# Patient Record
Sex: MALE | Race: WHITE
[De-identification: names, ages, dates, MRNs, and addresses within clinical notes are randomized per-mention and may not be internally consistent; named-entity substitution may affect disease eponyms.]

---

## 2017-02-22 ENCOUNTER — HOSPITAL ENCOUNTER (EMERGENCY)
Dept: HOSPITAL 45 - C.EDB | Age: 52
Discharge: HOME | End: 2017-02-22
Payer: COMMERCIAL

## 2017-02-22 VITALS
BODY MASS INDEX: 30.96 KG/M2 | WEIGHT: 209 LBS | HEIGHT: 69.02 IN | WEIGHT: 209 LBS | BODY MASS INDEX: 30.96 KG/M2 | HEIGHT: 69.02 IN

## 2017-02-22 VITALS — DIASTOLIC BLOOD PRESSURE: 49 MMHG | HEART RATE: 69 BPM | SYSTOLIC BLOOD PRESSURE: 94 MMHG | OXYGEN SATURATION: 95 %

## 2017-02-22 VITALS — TEMPERATURE: 98.96 F

## 2017-02-22 DIAGNOSIS — Z80.9: ICD-10-CM

## 2017-02-22 DIAGNOSIS — Z83.3: ICD-10-CM

## 2017-02-22 DIAGNOSIS — R50.9: Primary | ICD-10-CM

## 2017-02-22 DIAGNOSIS — Z90.49: ICD-10-CM

## 2017-02-22 DIAGNOSIS — Z90.89: ICD-10-CM

## 2017-02-22 DIAGNOSIS — Z84.1: ICD-10-CM

## 2017-02-22 DIAGNOSIS — M54.5: ICD-10-CM

## 2017-02-22 DIAGNOSIS — Z82.49: ICD-10-CM

## 2017-02-22 DIAGNOSIS — B34.9: ICD-10-CM

## 2017-02-22 DIAGNOSIS — D69.6: ICD-10-CM

## 2017-02-22 LAB
ALP SERPL-CCNC: 61 U/L (ref 45–117)
ALT SERPL-CCNC: 55 U/L (ref 12–78)
ANION GAP SERPL CALC-SCNC: 7 MMOL/L (ref 3–11)
APPEARANCE UR: CLEAR
AST SERPL-CCNC: 39 U/L (ref 15–37)
BASOPHILS # BLD: 0 K/UL (ref 0–0.2)
BASOPHILS NFR BLD: 0 %
BILIRUB UR-MCNC: (no result) MG/DL
BUN SERPL-MCNC: 20 MG/DL (ref 7–18)
BUN/CREAT SERPL: 14.6 (ref 10–20)
CALCIUM SERPL-MCNC: 8.3 MG/DL (ref 8.5–10.1)
CHLORIDE SERPL-SCNC: 106 MMOL/L (ref 98–107)
CO2 SERPL-SCNC: 27 MMOL/L (ref 21–32)
COLOR UR: (no result)
COMPLETE: YES
CREAT CL PREDICTED SERPL C-G-VRATE: 72.5 ML/MIN
CREAT SERPL-MCNC: 1.4 MG/DL (ref 0.6–1.4)
EOSINOPHIL NFR BLD AUTO: 127 K/UL (ref 130–400)
GLUCOSE SERPL-MCNC: 87 MG/DL (ref 70–99)
HCT VFR BLD CALC: 43.9 % (ref 42–52)
IG%: 0 %
IMM GRANULOCYTES NFR BLD AUTO: 26.9 %
LYMPHOCYTES # BLD: 0.76 K/UL (ref 1.2–3.4)
MANUAL MICROSCOPIC REQUIRED?: NO
MCH RBC QN AUTO: 32.1 PG (ref 25–34)
MCHC RBC AUTO-ENTMCNC: 36.2 G/DL (ref 32–36)
MCV RBC AUTO: 88.5 FL (ref 80–100)
MONOCYTES NFR BLD: 12.7 %
NEUTROPHILS # BLD AUTO: 0 %
NEUTROPHILS NFR BLD AUTO: 60.4 %
NITRITE UR QL STRIP: (no result)
PH UR STRIP: 5.5 [PH] (ref 4.5–7.5)
PMV BLD AUTO: 9.8 FL (ref 7.4–10.4)
POTASSIUM SERPL-SCNC: 3.8 MMOL/L (ref 3.5–5.1)
RBC # BLD AUTO: 4.96 M/UL (ref 4.7–6.1)
REVIEW REQ?: NO
SODIUM SERPL-SCNC: 140 MMOL/L (ref 136–145)
SP GR UR STRIP: > 1.045 (ref 1–1.03)
URINE BILL WITH OR WITHOUT MIC: (no result)
UROBILINOGEN UR-MCNC: (no result) MG/DL
WBC # BLD AUTO: 2.83 K/UL (ref 4.8–10.8)

## 2017-02-22 NOTE — DIAGNOSTIC IMAGING REPORT
ABDOMEN AND PELVIS CT WITHOUT CONTRAST



CT DOSE: 492.19 mGy.cm



HISTORY: Right abdominal pain  right flank pain



TECHNIQUE: Multiaxial CT images of the abdomen and pelvis were performed without

contrast.



COMPARISON STUDY: 10/17/2014



FINDINGS: Lung bases are clear. Liver spleen and pancreas are unremarkable.

Potential gastric wall thickening with a trace amount of pericolonic

infiltrative change. This is best seen transaxial image 33



Small nonobstructing lower pole left renal calcification. No evidence for an

obstructing urinary tract calculus.



Bowel pattern is remarkable for scattered diverticuli. There has been a mid

sigmoid anastomosis. Bladder is midline but relatively collapsed. There is no

free fluid within the pelvic cul-de-sac. I history the appendix has been

removed.



IMPRESSION: 



1. Gastric wall thickening raising the possibility of gastritis.

2. Endoscopic evaluation of the stomach is suggested.





3. Small nonobstructing lower pole left renal calcification.

4. No evidence for an obstructing urinary tract calculus. 







Electronically signed by:  Ike Stuart M.D.

2/22/2017 9:48 AM



Dictated Date/Time:  2/22/2017 9:41 AM

## 2017-02-22 NOTE — EMERGENCY ROOM VISIT NOTE
History


Report prepared by Char:  Ramón Bledsoe


Under the Supervision of:  NICOLE CarcamoO.


First contact with patient:  08:53


Chief Complaint:  ILLNESS


Stated Complaint:  FEVER, PAIN IN LOWER BACK/GROIN AREA X 4 DAYS





History of Present Illness


The patient is a 51 year old male who presents to the Emergency Room with 

complaints of flank pain. The patient states that 3-4 days ago he started 

noticing subjective fever. He has been taking Motrin and Tylenol with good 

relief of the symptoms but he started noticing lower back pain earlier today. 

He states the pain is mostly in the lower back but goes around to his right 

groin. He denies having any rash. He states his feels similar to when he is had 

kidney stones in the past. He states he's had some urinary symptoms including 

frequency as well as dysuria. He denies having any vomiting but did notice 

nausea. He denies having any chest pain but did experience some cough recently 

he also had some rhinorrhea could be consistent with an upper respiratory 

infection but when the back pain started he felt was consistent with a kidney 

stones we presented to the emergency department today. He states the pain is 

mild at this time. It is not worsened with movement. He denies having any 

radicular symptoms such as lower extremity numbness or weakness. The patient 

does not have a headache. He currently does not have a primary care physician 

because his primary care physician retired but tried to set up an appointment 

and was not able to set up an appointment quickly enough.





   Source of History:  patient


   Onset:  3-4 days ago


   Position:  other (flank)


   Symptom Intensity:  mild


   Associated Symptoms:  + back pain, + cough, + fevers, + nausea, + urinary 

symptoms, No chest pain, No headache, No numbness, No rash, No vomiting, No 

weakness





Review of Systems


See HPI for pertinent positives & negatives. A total of 10 systems reviewed and 

were otherwise negative.





Past Medical & Surgical


Medical Problems:


(1) Diverticular disease of colon


(2) Diverticulitis


(3) s/p appendectomy


(4) s/p partial colectomy








Family History





Cancer


Diabetes mellitus


Gallbladder disease


Hypertension


Kidney disease


Kidney stones





Social History


Smoking Status:  Never Smoker


Alcohol Use:  occasionally


Drug Use:  none


Marital Status:  


Occupation Status:  employed





Current/Historical Medications


Scheduled


Multivitamin (Multivitamin), 1 TAB PO DAILY


Omeprazole (Prilosec), 20 MG PO DAILY


Ranitidine Hcl (Zantac), 150 MG PO BID





Scheduled PRN


Acetaminophen (Tylenol), 1,000 MG PO for Pain





Allergies


Coded Allergies:  


     No Known Allergies (Unverified , 2/22/17)





Physical Exam


Vital Signs











  Date Time  Temp Pulse Resp B/P Pulse Ox O2 Delivery O2 Flow Rate FiO2


 


2/22/17 10:36  69 18 94/49 95 Room Air  


 


2/22/17 08:48 37.2 99 18 127/85 95 Room Air  











Physical Exam


GENERAL:  Patient is awake alert in no acute distress patient is resting 

comfortably and showing no signs of anxiety


EYES: The conjunctivae are clear.  The pupils are round and reactive. 


EARS, NOSE, MOUTH AND THROAT: The nose is without any evidence of any 

deformity. Mucous membranes are moist tongue is midline 


NECK: The neck is nontender and supple.


RESPIRATORY: Normal respiratory effort is noted there is no evidence of 

wheezing rhonchi or rales


CARDIOVASCULAR:  Regular rate and rhythm noted there no murmurs rubs or gallops 

normal S1 normal S2 


GASTROINTESTINAL: The abdomen is soft. Bowel sounds are present in all 

quadrants. Abdomen is nontender


BACK: No midline tenderness was noted. Mild right CVA tenderness was noted to 

percussion but range of motion appeared intact.


MUSCULOSKELETAL/EXTREMITIES: There is no evidence of gross deformity full range 

of motion is noted in the hips and shoulders


SKIN: There is no obvious evidence of any rash. There are no petechiae, pallor 

or cyanosis noted. 


NEUROLOGIC:  Patient is awake alert and oriented x3 strength is symmetric 

patellar reflexes are 2+ bilaterally





Medical Decision & Procedures


ER Provider


Diagnostic Interpretation:


Radiology results as stated below per my review and radiologist interpretation:





CHEST ONE VIEW PORTABLE





CLINICAL HISTORY: Abdominal pain.    





COMPARISON STUDY:  Chest radiograph January 3, 2013.





FINDINGS: Lung volumes are normal. No consolidation is identified. There is no


evidence of pulmonary edema. Cardiac size is within normal limits. No


pneumothorax or pleural effusion is present. 





IMPRESSION:  No acute cardiopulmonary findings. 








Electronically signed by:  Amandeep Sanford M.D.


2/22/2017 10:04 AM





Dictated Date/Time:  2/22/2017 10:03 AM





ABDOMEN AND PELVIS CT WITHOUT CONTRAST





CT DOSE: 492.19 mGy.cm





HISTORY: Right abdominal pain  right flank pain





TECHNIQUE: Multiaxial CT images of the abdomen and pelvis were performed without


contrast.





COMPARISON STUDY: 10/17/2014





FINDINGS: Lung bases are clear. Liver spleen and pancreas are unremarkable.


Potential gastric wall thickening with a trace amount of pericolonic


infiltrative change. This is best seen transaxial image 33





Small nonobstructing lower pole left renal calcification. No evidence for an


obstructing urinary tract calculus.





Bowel pattern is remarkable for scattered diverticuli. There has been a mid


sigmoid anastomosis. Bladder is midline but relatively collapsed. There is no


free fluid within the pelvic cul-de-sac. I history the appendix has been


removed.





IMPRESSION: 





1. Gastric wall thickening raising the possibility of gastritis.


2. Endoscopic evaluation of the stomach is suggested.








3. Small nonobstructing lower pole left renal calcification.


4. No evidence for an obstructing urinary tract calculus. 





Electronically signed by:  Ike Stuart M.D.


2/22/2017 9:48 AM





Dictated Date/Time:  2/22/2017 9:41 AM





Laboratory Results


2/22/17 09:05








Red Blood Count 4.96, Mean Corpuscular Volume 88.5, Mean Corpuscular Hemoglobin 

32.1, Mean Corpuscular Hemoglobin Concent 36.2, Mean Platelet Volume 9.8, 

Neutrophils (%) (Auto) 60.4, Lymphocytes (%) (Auto) 26.9, Monocytes (%) (Auto) 

12.7, Eosinophils (%) (Auto) 0.0, Basophils (%) (Auto) 0.0, Neutrophils # (Auto

) 1.71, Lymphocytes # (Auto) 0.76, Monocytes # (Auto) 0.36, Eosinophils # (Auto

) 0.00, Basophils # (Auto) 0.00





2/22/17 09:05

















Test


  2/22/17


09:05 2/22/17


09:26


 


White Blood Count


  2.83 K/uL


(4.8-10.8) 


 


 


Red Blood Count


  4.96 M/uL


(4.7-6.1) 


 


 


Hemoglobin


  15.9 g/dL


(14.0-18.0) 


 


 


Hematocrit 43.9 % (42-52)  


 


Mean Corpuscular Volume


  88.5 fL


() 


 


 


Mean Corpuscular Hemoglobin


  32.1 pg


(25-34) 


 


 


Mean Corpuscular Hemoglobin


Concent 36.2 g/dl


(32-36) 


 


 


Platelet Count


  127 K/uL


(130-400) 


 


 


Mean Platelet Volume


  9.8 fL


(7.4-10.4) 


 


 


Neutrophils (%) (Auto) 60.4 %  


 


Lymphocytes (%) (Auto) 26.9 %  


 


Monocytes (%) (Auto) 12.7 %  


 


Eosinophils (%) (Auto) 0.0 %  


 


Basophils (%) (Auto) 0.0 %  


 


Neutrophils # (Auto)


  1.71 K/uL


(1.4-6.5) 


 


 


Lymphocytes # (Auto)


  0.76 K/uL


(1.2-3.4) 


 


 


Monocytes # (Auto)


  0.36 K/uL


(0.11-0.59) 


 


 


Eosinophils # (Auto)


  0.00 K/uL


(0-0.5) 


 


 


Basophils # (Auto)


  0.00 K/uL


(0-0.2) 


 


 


RDW Standard Deviation


  40.3 fL


(36.4-46.3) 


 


 


RDW Coefficient of Variation


  12.5 %


(11.5-14.5) 


 


 


Immature Granulocyte % (Auto) 0.0 %  


 


Immature Granulocyte # (Auto)


  0.00 K/uL


(0.00-0.02) 


 


 


Anion Gap


  7.0 mmol/L


(3-11) 


 


 


Est Creatinine Clear Calc


Drug Dose 72.5 ml/min 


  


 


 


Estimated GFR (


American) 67.9 


  


 


 


Estimated GFR (Non-


American 58.6 


  


 


 


BUN/Creatinine Ratio 14.6 (10-20)  


 


Calcium Level


  8.3 mg/dl


(8.5-10.1) 


 


 


Total Bilirubin


  0.5 mg/dl


(0.2-1) 


 


 


Direct Bilirubin


  0.1 mg/dl


(0-0.2) 


 


 


Aspartate Amino Transf


(AST/SGOT) 39 U/L (15-37) 


  


 


 


Alanine Aminotransferase


(ALT/SGPT) 55 U/L (12-78) 


  


 


 


Alkaline Phosphatase


  61 U/L


() 


 


 


Total Protein


  6.9 gm/dl


(6.4-8.2) 


 


 


Albumin


  3.8 gm/dl


(3.4-5.0) 


 


 


Lipase


  233 U/L


() 


 


 


Urine Color  DK YELLOW 


 


Urine Appearance  CLEAR (CLEAR) 


 


Urine pH  5.5 (4.5-7.5) 


 


Urine Specific Gravity


  


  > 1.045


(1.000-1.030)


 


Urine Protein  1+ (NEG) 


 


Urine Glucose (UA)  NEG (NEG) 


 


Urine Ketones  TRACE (NEG) 


 


Urine Occult Blood  NEG (NEG) 


 


Urine Nitrite  NEG (NEG) 


 


Urine Bilirubin  NEG (NEG) 


 


Urine Urobilinogen  NEG (NEG) 


 


Urine Leukocyte Esterase  NEG (NEG) 


 


Urine WBC (Auto)  1-5 /hpf (0-5) 


 


Urine RBC (Auto)  0-4 /hpf (0-4) 


 


Urine Hyaline Casts (Auto)


  


  5-10 /lpf


(0-5)


 


Urine Epithelial Cells (Auto)


  


  10-20 /lpf


(0-5)


 


Urine Bacteria (Auto)  NEG (NEG) 








Laboratory results per my review.





Medications Administered











 Medications


  (Trade)  Dose


 Ordered  Sig/Yaneth


 Route  Start Time


 Stop Time Status Last Admin


Dose Admin


 


 Ketorolac


 Tromethamine 30 mg  30 mg  NOW  STAT


 IV  2/22/17 09:07


 2/22/17 09:09 DC 2/22/17 09:20


30 MG


 


 Sodium Chloride


  (Nss 1000ml)  1,000 ml @ 


 999 mls/hr  Q1H1M STAT


 IV  2/22/17 09:07


 2/22/17 10:10 DC 2/22/17 09:20


999 MLS/HR


 


 Ondansetron HCl


  (Zofran Inj)  4 mg  NOW  STAT


 IV  2/22/17 09:07


 2/22/17 09:09 DC 2/22/17 09:21


4 MG











ED Course


0853: The patient was evaluated in room B5. A complete history and physical 

examination were performed. 





0907: Zofran Inj 4mg IV, NSS 1,000 ml @ 999 mls/hr IV, Toradol Inj 30mg IV





1018: Upon reevaluation, the patient is feeling better. I discussed the results 

and treatment plan with him. He verbalized agreement of the treatment plan. He 

was discharged home.





Medical Decision


Prior records/ancillary studies reviewed.


Triage Nursing notes reviewed.





The patient's history was concerning for back pain.





Differential diagnosis:


Etiologies such as musculoskeletal, disc herniation, fracture, aortic disease, 

metastatic disease, cord compression, discitis, infection, renal colic, 

gastrointestinal, acute exacerbation of chronic back pain, sciatica, cauda 

equina, as well as others were entertained.





The patient is a 51-year-old male who presented to the emergency department for 

evaluation of back pain. The patient states that he had a subjective fever over 

the last few days. He has been taking Motrin and Tylenol. He also notices low 

back pain. The back pain is mostly on the left side and radiates to the left 

testicle. There is no sign of shingles on physical exam. The patient did not 

have midline tenderness. He came to the emergency department today because he 

was concerned he may have a kidney stone. The patient did not request have any 

medicine for pain because he only had mild pain. He was found have a low white 

blood cell count and a low platelet count. I do feel that this points to a 

viral source for his infection at this time. I discussed the patient's 

laboratory and radiographic studies with him including the findings on CAT scan 

in his stomach. He was started on Zantac and encouraged to continue taking his 

Prilosec. He was also encouraged to continue all medications as prescribed. He 

was also encouraged to follow-up with his family doctor this week for 

reevaluation as well as a referral to gastroenterology and repeat laboratory 

studies. He was also encouraged to return to the emergency Department 

immediately if symptoms change worsen or the need arises.





Impression





 Primary Impression:  


 Fever


 Additional Impressions:  


 Thrombocytopenia


 Back pain


 Viral syndrome





Scribe Attestation


The scribe's documentation has been prepared under my direction and personally 

reviewed by me in its entirety. I confirm that the note above accurately 

reflects all work, treatment, procedures, and medical decision making performed 

by me.





Departure Information


Dispostion


Home / Self-Care





Prescriptions





Ranitidine Hcl (ZANTAC) 150 Mg Tab


150 MG PO BID, #60 TAB


   Prov: Chon Gonsalves, DO         2/22/17





Referrals


No Doctor, Assigned (PCP)





Forms


HOME CARE DOCUMENTATION FORM,                                                 

               IMPORTANT VISIT INFORMATION, WORK / SCHOOL INSTRUCTIONS, Work 

Instructions





Patient Instructions


ED Fever Control, My [facility]





Additional Instructions





Continue all medications as prescribed. Drink plenty of clear liquids and keep 

herself well-hydrated. Continue using Motrin and Tylenol as directed for fever 

and pain. Follow-up with your family  as soon as possible. I would recommend 

a repeat of your CBC in 48 hours to recheck your white blood cell count and her 

platelet count. Return to the emergency department immediately if symptoms 

change worsen or the need arises.





Problem Qualifiers








 Primary Impression:  


 Fever


 Fever type:  unspecified  Qualified Codes:  R50.9 - Fever, unspecified


 Additional Impressions:  


 Back pain


 Back pain location:  low back pain  Chronicity:  acute  Back pain laterality:  

left  Sciatica presence:  without sciatica  Qualified Codes:  M54.5 - Low back 

pain

## 2017-02-22 NOTE — DIAGNOSTIC IMAGING REPORT
CHEST ONE VIEW PORTABLE



CLINICAL HISTORY: Abdominal pain.    



COMPARISON STUDY:  Chest radiograph January 3, 2013.



FINDINGS: Lung volumes are normal. No consolidation is identified. There is no

evidence of pulmonary edema. Cardiac size is within normal limits. No

pneumothorax or pleural effusion is present. 



IMPRESSION:  No acute cardiopulmonary findings. 









Electronically signed by:  Amandeep Sanford M.D.

2/22/2017 10:04 AM



Dictated Date/Time:  2/22/2017 10:03 AM

## 2017-02-27 ENCOUNTER — HOSPITAL ENCOUNTER (OUTPATIENT)
Dept: HOSPITAL 45 - C.LABPBG | Age: 52
Discharge: HOME | End: 2017-02-27
Attending: FAMILY MEDICINE
Payer: COMMERCIAL

## 2017-02-27 DIAGNOSIS — D72.819: ICD-10-CM

## 2017-02-27 DIAGNOSIS — Z00.00: Primary | ICD-10-CM

## 2017-02-27 DIAGNOSIS — R39.89: ICD-10-CM

## 2017-02-27 LAB
APPEARANCE UR: CLEAR
BASOPHILS # BLD: 0.01 K/UL (ref 0–0.2)
BASOPHILS NFR BLD: 0.3 %
BILIRUB UR-MCNC: (no result) MG/DL
CHOLEST/HDLC SERPL: 3.4 {RATIO}
COLOR UR: YELLOW
COMPLETE: YES
EOSINOPHIL NFR BLD AUTO: 114 K/UL (ref 130–400)
GLUCOSE UR QL: 40 MG/DL
HCT VFR BLD CALC: 42.8 % (ref 42–52)
IG%: 0 %
IMM GRANULOCYTES NFR BLD AUTO: 49.2 %
KETONES UR QL STRIP: 75 MG/DL
LYMPHOCYTES # BLD: 1.58 K/UL (ref 1.2–3.4)
MANUAL MICROSCOPIC REQUIRED?: NO
MCH RBC QN AUTO: 31 PG (ref 25–34)
MCHC RBC AUTO-ENTMCNC: 35.7 G/DL (ref 32–36)
MCV RBC AUTO: 86.6 FL (ref 80–100)
MONOCYTES NFR BLD: 11.8 %
NEUTROPHILS # BLD AUTO: 0.6 %
NEUTROPHILS NFR BLD AUTO: 38.1 %
NITRITE UR QL STRIP: (no result)
NITRITE UR QL STRIP: 96 MG/DL (ref 0–150)
PH UR STRIP: 6 [PH] (ref 4.5–7.5)
PH UR: 134 MG/DL (ref 0–200)
PMV BLD AUTO: 10.5 FL (ref 7.4–10.4)
PSA SERPL-MCNC: 0.76 NG/ML (ref 0–4)
RBC # BLD AUTO: 4.94 M/UL (ref 4.7–6.1)
REVIEW REQ?: NO
SP GR UR STRIP: 1.02 (ref 1–1.03)
TSH SERPL-ACNC: 2.16 UIU/ML (ref 0.3–4.5)
URINE EPITHELIAL CELL AUTO: (no result) /LPF (ref 0–5)
UROBILINOGEN UR-MCNC: (no result) MG/DL
VERY LOW DENSITY LIPOPROT CALC: 19 MG/DL
WBC # BLD AUTO: 3.21 K/UL (ref 4.8–10.8)

## 2017-03-06 ENCOUNTER — HOSPITAL ENCOUNTER (OUTPATIENT)
Dept: HOSPITAL 45 - C.LABPBG | Age: 52
Discharge: HOME | End: 2017-03-06
Attending: FAMILY MEDICINE
Payer: COMMERCIAL

## 2017-03-06 DIAGNOSIS — D69.6: Primary | ICD-10-CM

## 2017-03-06 LAB
BASOPHILS # BLD: 0.01 K/UL (ref 0–0.2)
BASOPHILS NFR BLD: 0.2 %
COMPLETE: YES
EOSINOPHIL NFR BLD AUTO: 295 K/UL (ref 130–400)
HCT VFR BLD CALC: 41.1 % (ref 42–52)
IG%: 0.4 %
IMM GRANULOCYTES NFR BLD AUTO: 30.5 %
LYMPHOCYTES # BLD: 1.64 K/UL (ref 1.2–3.4)
MCH RBC QN AUTO: 30.3 PG (ref 25–34)
MCHC RBC AUTO-ENTMCNC: 35.8 G/DL (ref 32–36)
MCV RBC AUTO: 84.7 FL (ref 80–100)
MONOCYTES NFR BLD: 8.4 %
NEUTROPHILS # BLD AUTO: 2.4 %
NEUTROPHILS NFR BLD AUTO: 58.1 %
PMV BLD AUTO: 9.6 FL (ref 7.4–10.4)
RBC # BLD AUTO: 4.85 M/UL (ref 4.7–6.1)
WBC # BLD AUTO: 5.37 K/UL (ref 4.8–10.8)

## 2017-05-25 ENCOUNTER — HOSPITAL ENCOUNTER (EMERGENCY)
Dept: HOSPITAL 45 - C.EDB | Age: 52
Discharge: HOME | End: 2017-05-25
Payer: COMMERCIAL

## 2017-05-25 VITALS — HEART RATE: 79 BPM | SYSTOLIC BLOOD PRESSURE: 146 MMHG | OXYGEN SATURATION: 96 % | DIASTOLIC BLOOD PRESSURE: 81 MMHG

## 2017-05-25 VITALS
BODY MASS INDEX: 29.88 KG/M2 | WEIGHT: 201.72 LBS | WEIGHT: 201.72 LBS | HEIGHT: 69.02 IN | BODY MASS INDEX: 29.88 KG/M2 | HEIGHT: 69.02 IN

## 2017-05-25 VITALS — TEMPERATURE: 98.42 F

## 2017-05-25 DIAGNOSIS — Z79.82: ICD-10-CM

## 2017-05-25 DIAGNOSIS — Z82.49: ICD-10-CM

## 2017-05-25 DIAGNOSIS — Z83.3: ICD-10-CM

## 2017-05-25 DIAGNOSIS — Z90.49: ICD-10-CM

## 2017-05-25 DIAGNOSIS — Z98.890: ICD-10-CM

## 2017-05-25 DIAGNOSIS — Z83.79: ICD-10-CM

## 2017-05-25 DIAGNOSIS — K57.90: ICD-10-CM

## 2017-05-25 DIAGNOSIS — Z80.9: ICD-10-CM

## 2017-05-25 DIAGNOSIS — Z79.899: ICD-10-CM

## 2017-05-25 DIAGNOSIS — Z84.1: ICD-10-CM

## 2017-05-25 DIAGNOSIS — K57.92: Primary | ICD-10-CM

## 2017-05-25 LAB
ALP SERPL-CCNC: 69 U/L (ref 45–117)
ALT SERPL-CCNC: 40 U/L (ref 12–78)
ANION GAP SERPL CALC-SCNC: 6 MMOL/L (ref 3–11)
APPEARANCE UR: CLEAR
AST SERPL-CCNC: 33 U/L (ref 15–37)
BASOPHILS # BLD: 0.01 K/UL (ref 0–0.2)
BASOPHILS NFR BLD: 0.1 %
BILIRUB UR-MCNC: (no result) MG/DL
BUN SERPL-MCNC: 14 MG/DL (ref 7–18)
BUN/CREAT SERPL: 12.1 (ref 10–20)
CALCIUM SERPL-MCNC: 8.7 MG/DL (ref 8.5–10.1)
CHLORIDE SERPL-SCNC: 106 MMOL/L (ref 98–107)
CO2 SERPL-SCNC: 30 MMOL/L (ref 21–32)
COLOR UR: YELLOW
COMPLETE: YES
CREAT CL PREDICTED SERPL C-G-VRATE: 83.2 ML/MIN
CREAT SERPL-MCNC: 1.2 MG/DL (ref 0.6–1.4)
EOSINOPHIL NFR BLD AUTO: 229 K/UL (ref 130–400)
GLUCOSE SERPL-MCNC: 93 MG/DL (ref 70–99)
HCT VFR BLD CALC: 42.9 % (ref 42–52)
IG%: 0.2 %
IMM GRANULOCYTES NFR BLD AUTO: 14 %
INR PPP: 1 (ref 0.9–1.1)
LYMPHOCYTES # BLD: 1.5 K/UL (ref 1.2–3.4)
MANUAL MICROSCOPIC REQUIRED?: NO
MCH RBC QN AUTO: 30.1 PG (ref 25–34)
MCHC RBC AUTO-ENTMCNC: 34.5 G/DL (ref 32–36)
MCV RBC AUTO: 87.4 FL (ref 80–100)
MONOCYTES NFR BLD: 6.7 %
NEUTROPHILS # BLD AUTO: 1.3 %
NEUTROPHILS NFR BLD AUTO: 77.7 %
NITRITE UR QL STRIP: (no result)
PARTIAL THROMBOPLASTIN RATIO: 1.1
PH UR STRIP: 5.5 [PH] (ref 4.5–7.5)
PMV BLD AUTO: 9.5 FL (ref 7.4–10.4)
POTASSIUM SERPL-SCNC: 3.4 MMOL/L (ref 3.5–5.1)
PROTHROMBIN TIME: 10.9 SECONDS (ref 9–12)
RBC # BLD AUTO: 4.91 M/UL (ref 4.7–6.1)
REVIEW REQ?: NO
SODIUM SERPL-SCNC: 142 MMOL/L (ref 136–145)
SP GR UR STRIP: 1.02 (ref 1–1.03)
URINE EPITHELIAL CELL AUTO: (no result) /LPF (ref 0–5)
UROBILINOGEN UR-MCNC: (no result) MG/DL
WBC # BLD AUTO: 10.72 K/UL (ref 4.8–10.8)
ZZUR CULT IF INDIC CLEAN CATCH: NO

## 2017-05-25 NOTE — DIAGNOSTIC IMAGING REPORT
ABDOMEN AND PELVIS CT WITHOUT CONTRAST



CT DOSE: 537.45 mGy.cm



HISTORY: Pain. Nausea.  suprapubic / infraumbilical abd pain x 4 days



TECHNIQUE: Multiaxial CT images of the abdomen and pelvis were performed without

contrast.



COMPARISON STUDY: 2/22/2017



FINDINGS: Lung bases are clear. Liver spleen and pancreas are unremarkable.

Kidneys negative for hydronephrosis. Bowel pattern within the abdomen is

nonobstructive.



Examination of the pelvis demonstrates wall thickening and edematous change of

the sigmoid colon proximal to an anastomotic line. Several diverticuli are

present. There is a moderate pericolonic infiltrative change. There is no

evidence for abscess collection or obstructive change.



IMPRESSION: 



1. Acute proximal sigmoid diverticulitis.





2. No evidence for abscess collection or obstruction. 







Electronically signed by:  Ike Stuart M.D.

5/25/2017 6:40 PM



Dictated Date/Time:  5/25/2017 6:37 PM

## 2017-05-25 NOTE — EMERGENCY ROOM VISIT NOTE
History


Report prepared by Char:  Cristian Gary


Under the Supervision of:  Dr. Dipak Cancino D.O.


First contact with patient:  17:22


Chief Complaint:  ABDOMINAL PAIN


Stated Complaint:  ABDOMINAL PAIN





History of Present Illness


The patient is a 51 year old male with a history of diverticulitis who presents 

to the Emergency Room with complaints of persistent lower abdominal pain that 

started 4 days ago. He says that this feels like another bout of 

diverticulitis. The patient states that he had surgery for the diverticulitis 

and had 9 inches of his colon removed. He states that he had a colonoscopy 

after a bout of diverticulitis which occurred after the surgery, and the 

patient was told that he still has one spot that is "messed up", but is nothing 

too bad to be operated on currently. The patient notes that his lower abdominal 

pain is below his belly button and radiates down into his lower grown. He says 

that he gets intermittent sharp pain. The patient says that eating makes his 

pain a bit worse, so he has not been eating much. The patient took Metamucil 

yesterday, but the pain worsened. He had a few bowel movements today, but he 

says that he feels a bit blocked. The patient denies any nausea, vomiting, 

shortness of breath, or urinary symptoms. He has a history of an appendectomy. 

He still has his gallbladder.





   Source of History:  patient


   Onset:  4 days ago


   Position:  abdomen (lower)


   Quality:  sharp


   Timing:  other (persistent)


   Modifying Factors (Worsening):  eating


   Associated Symptoms:  No SOB, No nausea, No urinary symptoms, No vomiting


Note:


Associated symptoms: Pain radiates into groin area. Has had a few bowel 

movements, but feels a bit blocked.





Review of Systems


See HPI for pertinent positives & negatives. A total of 10 systems reviewed and 

were otherwise negative.





Past Medical & Surgical


Medical Problems:


(1) Diverticular disease of colon


(2) Diverticulitis


(3) s/p appendectomy


(4) s/p partial colectomy








Family History





Cancer


Diabetes mellitus


Gallbladder disease


Hypertension


Kidney disease


Kidney stones





Social History


Smoking Status:  Never Smoker


Alcohol Use:  occasionally


Drug Use:  none


Marital Status:  


Occupation Status:  employed





Current/Historical Medications


Scheduled


Aspirin (Aspirin Ec), 81 MG PO DAILY


Ciprofloxacin Hcl (Cipro), 500 MG PO BID


Metronidazole (Flagyl), 500 MG PO BID


Multivitamin (Multivitamin), 1 TAB PO DAILY


Omeprazole (Prilosec), 20 MG PO DAILY





Allergies


Coded Allergies:  


     No Known Allergies (Unverified , 5/25/17)





Physical Exam


Vital Signs











  Date Time  Temp Pulse Resp B/P Pulse Ox O2 Delivery O2 Flow Rate FiO2


 


5/25/17 17:20 36.9 69 18 143/96 98 Room Air  











Physical Exam


CONSTITUTIONAL/VITAL SIGNS: Reviewed / noted above.


GENERAL: Non-toxic in appearance. 


INTEGUMENTARY: Warm, dry, and Pink.


HEAD: Normocephalic.


EYES: without scleral icterus or trauma.


ENT/OROPHARYNX: clear and moist.


LYMPHADENOPATHY/NECK: Is supple without lymphadenopathy or meningismus.


RESPIRATORY: Lungs clear and equal.


CARDIOVASCULAR: Regular rate and rhythm.


GI/ABDOMEN: Soft. Tenderness over the suprapubic/infraumbilical area. No 

organomegaly or pulsatile mass. No rebound or guarding. Normal bowel sounds.


EXTREMITIES: Warm and well perfused.


BACK: No CVA tenderness.


NEUROLOGICAL: Intact without focal deficits. 


PSYCHIATRIC: normal affect.


MUSCULOSKELETAL: Normally developed with good muscle tone.





Medical Decision & Procedures


ER Provider


Diagnostic Interpretation:


CT results as stated below per my review and radiologist interpretation: 








ABDOMEN AND PELVIS CT WITHOUT CONTRAST





CT DOSE: 537.45 mGy.cm





HISTORY: Pain. Nausea.  suprapubic / infraumbilical abd pain x 4 days





TECHNIQUE: Multiaxial CT images of the abdomen and pelvis were performed without


contrast.





COMPARISON STUDY: 2/22/2017





FINDINGS: Lung bases are clear. Liver spleen and pancreas are unremarkable.


Kidneys negative for hydronephrosis. Bowel pattern within the abdomen is


nonobstructive.





Examination of the pelvis demonstrates wall thickening and edematous change of


the sigmoid colon proximal to an anastomotic line. Several diverticuli are


present. There is a moderate pericolonic infiltrative change. There is no


evidence for abscess collection or obstructive change.





IMPRESSION: 





1. Acute proximal sigmoid diverticulitis.








2. No evidence for abscess collection or obstruction. 





Electronically signed by:  Ike Stuart M.D.


5/25/2017 6:40 PM





Dictated Date/Time:  5/25/2017 6:37 PM





Laboratory Results


5/25/17 17:45








Red Blood Count 4.91, Mean Corpuscular Volume 87.4, Mean Corpuscular Hemoglobin 

30.1, Mean Corpuscular Hemoglobin Concent 34.5, Mean Platelet Volume 9.5, 

Neutrophils (%) (Auto) 77.7, Lymphocytes (%) (Auto) 14.0, Monocytes (%) (Auto) 

6.7, Eosinophils (%) (Auto) 1.3, Basophils (%) (Auto) 0.1, Neutrophils # (Auto) 

8.33, Lymphocytes # (Auto) 1.50, Monocytes # (Auto) 0.72, Eosinophils # (Auto) 

0.14, Basophils # (Auto) 0.01





5/25/17 17:45

















Test


  5/25/17


17:45


 


White Blood Count


  10.72 K/uL


(4.8-10.8)


 


Red Blood Count


  4.91 M/uL


(4.7-6.1)


 


Hemoglobin


  14.8 g/dL


(14.0-18.0)


 


Hematocrit 42.9 % (42-52) 


 


Mean Corpuscular Volume


  87.4 fL


()


 


Mean Corpuscular Hemoglobin


  30.1 pg


(25-34)


 


Mean Corpuscular Hemoglobin


Concent 34.5 g/dl


(32-36)


 


Platelet Count


  229 K/uL


(130-400)


 


Mean Platelet Volume


  9.5 fL


(7.4-10.4)


 


Neutrophils (%) (Auto) 77.7 % 


 


Lymphocytes (%) (Auto) 14.0 % 


 


Monocytes (%) (Auto) 6.7 % 


 


Eosinophils (%) (Auto) 1.3 % 


 


Basophils (%) (Auto) 0.1 % 


 


Neutrophils # (Auto)


  8.33 K/uL


(1.4-6.5)


 


Lymphocytes # (Auto)


  1.50 K/uL


(1.2-3.4)


 


Monocytes # (Auto)


  0.72 K/uL


(0.11-0.59)


 


Eosinophils # (Auto)


  0.14 K/uL


(0-0.5)


 


Basophils # (Auto)


  0.01 K/uL


(0-0.2)


 


RDW Standard Deviation


  38.5 fL


(36.4-46.3)


 


RDW Coefficient of Variation


  12.0 %


(11.5-14.5)


 


Immature Granulocyte % (Auto) 0.2 % 


 


Immature Granulocyte # (Auto)


  0.02 K/uL


(0.00-0.02)


 


Prothrombin Time


  10.9 SECONDS


(9.0-12.0)


 


Prothromb Time International


Ratio 1.0 (0.9-1.1) 


 


 


Activated Partial


Thromboplast Time 29.2 SECONDS


(21.0-31.0)


 


Partial Thromboplastin Ratio 1.1 


 


Urine Color YELLOW 


 


Urine Appearance CLEAR (CLEAR) 


 


Urine pH 5.5 (4.5-7.5) 


 


Urine Specific Gravity


  1.018


(1.000-1.030)


 


Urine Protein NEG (NEG) 


 


Urine Glucose (UA) NEG (NEG) 


 


Urine Ketones NEG (NEG) 


 


Urine Occult Blood NEG (NEG) 


 


Urine Nitrite NEG (NEG) 


 


Urine Bilirubin NEG (NEG) 


 


Urine Urobilinogen NEG (NEG) 


 


Urine Leukocyte Esterase NEG (NEG) 


 


Urine WBC (Auto) 1-5 /hpf (0-5) 


 


Urine RBC (Auto) 0-4 /hpf (0-4) 


 


Urine Hyaline Casts (Auto) 0 /lpf (0-5) 


 


Urine Epithelial Cells (Auto) 0-5 /lpf (0-5) 


 


Urine Bacteria (Auto) NEG (NEG) 


 


Anion Gap


  6.0 mmol/L


(3-11)


 


Est Creatinine Clear Calc


Drug Dose 83.2 ml/min 


 


 


Estimated GFR (


American) 81.8 


 


 


Estimated GFR (Non-


American 70.6 


 


 


BUN/Creatinine Ratio 12.1 (10-20) 


 


Calcium Level


  8.7 mg/dl


(8.5-10.1)


 


Total Bilirubin


  0.9 mg/dl


(0.2-1)


 


Direct Bilirubin


  0.2 mg/dl


(0-0.2)


 


Aspartate Amino Transf


(AST/SGOT) 33 U/L (15-37) 


 


 


Alanine Aminotransferase


(ALT/SGPT) 40 U/L (12-78) 


 


 


Alkaline Phosphatase


  69 U/L


()


 


Total Protein


  7.2 gm/dl


(6.4-8.2)


 


Albumin


  3.9 gm/dl


(3.4-5.0)


 


Lipase


  146 U/L


()





Laboratory results as stated above per my review.





Medications Administered











 Medications


  (Trade)  Dose


 Ordered  Sig/Yaneth


 Route  Start Time


 Stop Time Status Last Admin


Dose Admin


 


 Sodium Chloride


  (Nss 1000ml)  1,000 ml @ 


 999 mls/hr  Q1H1M STAT


 IV  5/25/17 17:28


 5/25/17 18:28 DC 5/25/17 17:56


999 MLS/HR


 


 Ketorolac


 Tromethamine


  (Toradol Inj)  30 mg  NOW  STAT


 IV  5/25/17 17:28


 5/25/17 17:31 DC 5/25/17 17:56


30 MG











ED Course


1724: Previous medical records were reviewed. The patient was evaluated in room 

C7. A complete history and physical examination was performed.





1728: Ordered Toradol Inj 30 mg IV, NSS 1000 ml @ 999 mls/hr IV.





1916: Ordered Flagyl Tab 500 mg PO, Cipro Tab 500 mg PO.





1918: On reevaluation, the patient is resting comfortably. I discussed the 

results and findings with the patient. He verbalized agreement of the treatment 

plan. He was discharged home.





Medical Decision


Differential considered: pancreatitis, hepatitis, or acute cholecystitis, AAA,  

UTI, pyelonephritis, kidney stones, diverticulitis, shingles, bowel obstruction 

mesenteric ischemia, intussusception,hernia, testicular torsion.





Medication Reconciliation: I attest that I have personally reviewed the patient'

s current medication list.


Blood pressure Screening: Patient was found to have normal blood pressure on 

screening and does not require follow-up.





This is a 51-year-old male who presents to the ED with a chief complaint of 

abdominal pain in the suprapubic/infraumbilical area.  His symptoms started 4 

days ago on Sunday.  He reports a past history of partial colectomy related to 

diverticulitis and also has had his appendix removed in the past.  The patient 

reports that his symptoms seem similar to his diverticulitis in the past.  His 

vital signs are normal.  His physical exam reveals some tenderness in the 

infraumbilical and suprapubic area.  CBC and complete metabolic panel were 

normal.  Urine did not show infection.  CT scan of the abdomen and pelvis 

reveals findings suggesting acute sigmoid diverticulitis.  The patient was 

started on Cipro and Flagyl.  He was given Toradol for pain.  The patient is 

felt to be stable for discharge.





Impression





 Primary Impression:  


 Acute diverticulitis





Scribe Attestation


The scribe's documentation has been prepared under my direction and personally 

reviewed by me in its entirety. I confirm that the note above accurately 

reflects all work, treatment, procedures, and medical decision making performed 

by me.





Departure Information


Dispostion


Home / Self-Care





Prescriptions





Metronidazole (Flagyl) 500 Mg Tab


500 MG PO BID, #20 TAB


   Prov: Dipak Cancino D.O.         5/25/17 


Ciprofloxacin Hcl (CIPRO) 500 Mg Tab


500 MG PO BID, #20 TAB


   Prov: Dipak Cancino D.O.         5/25/17





Referrals


Mike Gates D.O. (PCP)





Patient Instructions


Diverticulitis Dc, [facility]





Additional Instructions





Follow-up with your doctor for further care and evaluation in 1-6 days.  Return 

to the emergency department for worsening or new symptoms or any concerns.


You have been examined and treated today on an emergency basis only. This is 

not a substitute for, or an effort to provide, complete comprehensive medical 

care. It is impossible to recognize and treat all injuries or illnesses in a 

single emergency department visit. It is therefore important that you follow up 

closely with your doctor.  Call as soon as possible for an appointment.





Cipro and Flagyl as prescribed.

## 2018-02-23 ENCOUNTER — HOSPITAL ENCOUNTER (OUTPATIENT)
Dept: HOSPITAL 45 - C.RAD | Age: 53
Discharge: HOME | End: 2018-02-23
Attending: PHYSICIAN ASSISTANT
Payer: COMMERCIAL

## 2018-02-23 DIAGNOSIS — M25.561: Primary | ICD-10-CM

## 2018-02-23 NOTE — DIAGNOSTIC IMAGING REPORT
R KNEE 4 OR MORE VIEWS



CLINICAL HISTORY: M25.561 Right knee painget a SUNRISE viewright pain



COMPARISON: None.



DISCUSSION: The bones and joint spaces appear intact. There is no evidence of

fracture, dislocation or bony disease. There is no evidence for soft tissue

swelling.



IMPRESSION: Negative study.











The above report was generated using voice recognition software.  It may contain

grammatical, syntax or spelling errors.







Electronically signed by:  Ike Stuart M.D.

2/23/2018 1:36 PM



Dictated Date/Time:  2/23/2018 1:34 PM